# Patient Record
Sex: FEMALE | Race: WHITE | NOT HISPANIC OR LATINO | URBAN - METROPOLITAN AREA
[De-identification: names, ages, dates, MRNs, and addresses within clinical notes are randomized per-mention and may not be internally consistent; named-entity substitution may affect disease eponyms.]

---

## 2017-01-31 ENCOUNTER — ALLSCRIPTS OFFICE VISIT (OUTPATIENT)
Dept: OTHER | Facility: OTHER | Age: 22
End: 2017-01-31

## 2017-02-17 ENCOUNTER — ALLSCRIPTS OFFICE VISIT (OUTPATIENT)
Dept: OTHER | Facility: OTHER | Age: 22
End: 2017-02-17

## 2017-02-23 ENCOUNTER — GENERIC CONVERSION - ENCOUNTER (OUTPATIENT)
Dept: OTHER | Facility: OTHER | Age: 22
End: 2017-02-23

## 2017-04-20 ENCOUNTER — ALLSCRIPTS OFFICE VISIT (OUTPATIENT)
Dept: OTHER | Facility: OTHER | Age: 22
End: 2017-04-20

## 2017-06-01 ENCOUNTER — ALLSCRIPTS OFFICE VISIT (OUTPATIENT)
Dept: OTHER | Facility: OTHER | Age: 22
End: 2017-06-01

## 2017-06-29 ENCOUNTER — ALLSCRIPTS OFFICE VISIT (OUTPATIENT)
Dept: OTHER | Facility: OTHER | Age: 22
End: 2017-06-29

## 2017-07-13 ENCOUNTER — ALLSCRIPTS OFFICE VISIT (OUTPATIENT)
Dept: OTHER | Facility: OTHER | Age: 22
End: 2017-07-13

## 2017-09-28 ENCOUNTER — ALLSCRIPTS OFFICE VISIT (OUTPATIENT)
Dept: OTHER | Facility: OTHER | Age: 22
End: 2017-09-28

## 2017-10-12 ENCOUNTER — ALLSCRIPTS OFFICE VISIT (OUTPATIENT)
Dept: OTHER | Facility: OTHER | Age: 22
End: 2017-10-12

## 2018-01-09 NOTE — PSYCH
Behavioral Health Outpatient Intake    Referred By: Sumeet Delgado  Intake Questions: there are no developmental disabilities  the patient does not have a hearing impairment  the patient does not have an ICM or CTT  patient is not taking injectable psychiatric medications  Employment: The patient is employed part time at [address] [place] [address]  Emergency Contact Information:   Emergency Contact: KYLIE TORRES   Relationship to Patient: MOTHER   Phone Number: [phone]   Previous Psychiatric Treatment: She has previously been seen by a psychiatrist  ?, 3 YRS AGO  She has not been previously seen by a therapist     History: no  service  She has not had combat service  She was not activated into federal active duty as a member of the [employer] or [employer]  Insurance Subscriber: DES MELISSA   : 3-54   Employer: [employer]   Primary Insurance: Osmel Muñoz   Phone number: [phone]   ID number: [id number]   Group number: [id number]         Presenting Problem (in patient's words): DEPRESSION AND ANXIETY  Substance Abuse: NONE  Previous Treatment: The patient has not been seen here in the past      Accepted as Patient   Sisi Martins 17 @ 11:00     Primary Care Physician: DR Carlyle Redd   Electronically signed by : Joey Cardoza, ; 2017  8:29AM EST                       (Author)

## 2018-01-09 NOTE — PSYCH
Progress Note  Psychotherapy Provided [facility]: Individual Psychotherapy 45 minutes provided today  Goals addressed in session:   Goals: 1, 2 & 3  D- Luciano Tate stated that she has been doing well  She shared that she made new friends at the [facility]  In addition, she has been keeping up with her assignments at school and also has been doing very well at work in organizing her tasks  Continuing to work on use of organizational skills as well as ways to increase socialization  GIving supportive therapy  A- PRogress - Continuing to increase organizational skills  P-Continue treatment       Pain Scale and Suicide Risk [facility]: Current Pain Assessment: no pain   On a scale of 0 to 10, the patient rates current pain at 0   Behavioral Health Treatment Plan [facility]: Diagnosis and Treatment Plan explained to patient, patient relates understanding diagnosis and is agreeable to Treatment Plan  Assessment    1   Social anxiety disorder (300 23) (F40 10)    Signatures   Electronically signed by : Prince Dia LCSW; Oct  2 2017 11:43AM EST                       (Author)

## 2018-01-11 NOTE — PSYCH
History of Present Illness  Psychotherapy Provided [facility]: Individual Psychotherapy 30 minutes minutes provided today  Goals addressed in session:   Patient has history of anxiety and mood issues  Had been in counseling in [facility] briefly  Was prescribed Amitriptyline in past  Continues to have issues with social anxiety  C/O issues with focus and concentration that affects her schoolwork  Is studying business at [employer]  Works at [employer], Mother in treatment for Bipolar Disorder  Patient wondering if focus or concentration issues are ADHD related, May be more of a by-product of her anxiety issues  Patient verbal and cooperative  HPI - Psych: Patient has prior brief counseling and treatment history via [facility]  No inpatient treatment, Mother in treatment for Bipolar Disorder and has history of micky  Patient denies ay issues with mood lability  Denies any depressive symptoms  No SI  Anxiety more of an issues in social settings but she has drew able to manage   Note   Note:   Patient in agreement to be evaluated by psychiatrist for anxiety, possible ADHD issues  Will complete referral to [facility]  for service  Reviewed some coping strategies for anxiety and she was given handout on same  Provided my contact information and offered additional sessions if needed  Assessment    1   Social anxiety disorder (300 23) (F40 10)    Signatures   Electronically signed by : Katina Reyes LCSW; Feb 17 2017  9:12AM EST                       (Author)

## 2018-01-13 VITALS
HEART RATE: 92 BPM | HEIGHT: 62 IN | RESPIRATION RATE: 16 BRPM | DIASTOLIC BLOOD PRESSURE: 76 MMHG | WEIGHT: 144 LBS | TEMPERATURE: 98.5 F | BODY MASS INDEX: 26.5 KG/M2 | SYSTOLIC BLOOD PRESSURE: 124 MMHG

## 2018-01-15 NOTE — PSYCH
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  In addition, she is taking a trip to visit with friends which is a positive focus  Working on issues with school assignment and staying on task  Discussing ways to break down assignments and ways to approach problem solving in general  Giving supportive therapy  A - Progress - Continuing to work on use of problem solving skills  P-Continue treatment       Pain Scale and Suicide Risk [facility]: Current Pain Assessment: no pain   On a scale of 0 to 10, the patient rates current pain at 0   Behavioral Health Treatment Plan [employer]: Diagnosis and Treatment Plan explained to patient, patient relates understanding diagnosis and is agreeable to Treatment Plan  Assessment    1   Social anxiety disorder (300 23) (F40 10)    Signatures   Electronically signed by : Federico Farfan LCSW; Jun 29 2017  3:53PM EST                       (Author)

## 2018-01-15 NOTE — PSYCH
Date of Initial Treatment Plan: 6/1/17  Date of Current Treatment Plan: 6/1/17  Treatment Plan 1  Strengths/Personal Resources for Self Care: I'm a supportive friend, I always look out for people, clever, I put God first, creativity  Diagnosis:   Axis I: Social anxiety   Axis II: Deferred   Axis III: Good health     Area of Needs: Social anxiety  Staying on task  Lack of motivation  Long Term Goals:   Initiate more   Target Date: 10/01/17      Graduate   Target Date: 10/01/17      Stay focused on the important things   Target Date: 10/01/17    Short Term Objectives:   Goal 1:   Ask someone to do something that I don't normally spend time with  Get out of my comfort zone more  Learn to participate more when I have the opportunity  Participate more on discussion boards for my online courses  Participate in business club at school  Comment more at meetings  Talk to more of the older members at the [facility]  Target Date: 10/01/17      Goal 2:   Turn in all of my assignments on time  Make sure that I study for the exams  Learn how to study for exams  Ask for help when I need it  Target Date:       Goal 3:   Wake up at a normal time in the morning  Put my phone away before bed so that I'm not up late  Stay in a routine for chore and assignments  Decrease procrastination  Work on getting ministry time in early in the month  Target Date: 10/01/17      GOAL 1: Modality: Individual 1-2 x per month Target Date: 10/01/17       The person(s) responsible for carrying out the plan is Katerin Torrez  GOAL 2: Modality: Individual 1-2 x per month Target Date: 10/01/17       The person(s) responsible for carrying out the plan is Katerin Torrez  GOAL 3: Modality: Individual 1-2 x per month Target Date: 10/01/17         The person(s) responsible for carrying out the plan is Katerin Torrez  The first scheduled review date is 10/01/17                 Patient Signature: _________________________________ Date/Time: ______________        1  Amenorrhea (626 0) (N91 2)   2  Depression with anxiety (300 4) (F41 8)   3  Ovarian cyst (620 2) (N83 20)   4  PCOS (polycystic ovarian syndrome) (256 4) (E28 2)   5   Social anxiety disorder (300 23) (F40 10)     Electronically signed by : Jasen Baer LCSW; Jun 1 2017  9:53AM EST                       (Author)

## 2018-01-15 NOTE — PSYCH
Progress Note  Psychotherapy Provided [facility]: Individual Psychotherapy 45 minutes provided today  Goals addressed in session:   Goals: 1, 2 & 3  D- Corinna Carver stated that she has been doing well  She admits to continuing to struggle with social anxiety  Discussing symptoms as well as triggers  Discussing ways for her to increase social interaction as well as motivation  Completing the PHQ9 determining moderate depression  Creating a treatment plan  Giving supportive therapy  A- PRogress -Continuing to work on decreasing social anxiety  P-Continue treatment       Pain Scale and Suicide Risk [facility]: Current Pain Assessment: no pain   On a scale of 0 to 10, the patient rates current pain at 0   Behavioral Health Treatment Plan [facility]: Diagnosis and Treatment Plan explained to patient, patient relates understanding diagnosis and is agreeable to Treatment Plan  Results/Data  PHQ-9 Adult Depression Screening 01Jun2017 09:12AM Jeffy Cottrell     Test Name Result Flag Reference   PHQ-9 Adult Depression Score 10     Over the last two weeks, how often have you been bothered by any of the following problems? Little interest or pleasure in doing things: More than half the days - 2  Feeling down, depressed, or hopeless: Several days - 1  Trouble falling or staying asleep, or sleeping too much: More than half the days - 2  Feeling tired or having little energy: More than half the days - 2  Poor appetite or over eating: Several days - 1  Feeling bad about yourself - or that you are a failure or have let yourself or your family down: Several days - 1  Trouble concentrating on things, such as reading the newspaper or watching television: Several days - 1  Moving or speaking so slowly that other people could have noticed   Or the opposite -  being so fidgety or restless that you have been moving around a lot more than usual: Not at all - 0  Thoughts that you would be better off dead, or of hurting yourself in some way: Not at all - 0   PHQ-9 Adult Depression Screening Negative     PHQ-9 Difficulty Level Not difficult at all     PHQ-9 Severity Moderate Depression         Assessment    1   Social anxiety disorder (300 23) (F40 10)    Signatures   Electronically signed by : Kendy Butts LCSW; Jun 1 2017 12:49PM EST                       (Author)

## 2018-01-16 NOTE — PSYCH
Progress Note  Psychotherapy Provided [facility]: Individual Psychotherapy 45 minutes provided today  Goals addressed in session:   Goals: 1 & 2  D- Lee Caal stated that she [place] her [place]  She stated that she has also been working on staying on task with her school work and maintaining a structured schedule as well as using visualization techniques in order to complete assignments in a timely manner  Working on forming and maintaining healthy relationships in her life while reducing social anxiety  Giving supportive therapy  A- PRogress - Continuing to work on decreasing social anxiety  P-Continue treatment       Pain Scale and Suicide Risk [facility]: Current Pain Assessment: no pain   On a scale of 0 to 10, the patient rates current pain at 0   Behavioral Health Treatment Plan [facility]: Diagnosis and Treatment Plan explained to patient, patient relates understanding diagnosis and is agreeable to Treatment Plan  Assessment    1   Social anxiety disorder (300 23) (F40 10)    Signatures   Electronically signed by : Claudio Goodwin LCSW; Jul 13 2017  4:22PM EST                       (Author)

## 2018-01-17 NOTE — PSYCH
Progress Note  Psychotherapy Provided [facility]: Individual Psychotherapy 45 minutes provided today  Goals addressed in session:   Goals: 1 & 2  D- Nelly Lawson stated that she feels that she is doing well  She shared that she is enjoying her promotion at work  In addition, she feels that her semester is continuing to go well at school  She discussed her upcoming trip to [facility] with her mother to visit family and to attend a wedding  She also stated that she continues to work on increasing socialization  Continuing to work on decreasing social anxiety  Giving supportive therapy  A- PRogress - Continuing to process her emotions  P-Continue treatment       Pain Scale and Suicide Risk [facility]: Current Pain Assessment: no pain   On a scale of 0 to 10, the patient rates current pain at 0   Behavioral Health Treatment Plan [facility]: Diagnosis and Treatment Plan explained to patient, patient relates understanding diagnosis and is agreeable to Treatment Plan  Assessment    1   Social anxiety disorder (300 23) (F40 10)    Signatures   Electronically signed by : Gregg Hsu LCSW; Nov 28 2017  1:45PM EST                       (Author)

## 2018-01-17 NOTE — PSYCH
History of Present Illness    Pre-morbid level of function and History of Present Illness:  Sheba Dyer is a 25 Y O female referred for treatment by Maria Ines Garza  She stated that she struggles with anxiety as well as concentration and focus  Reason for evaluation and partial hospitalization as an alternative to inpatient hospitalization: N/A  Previous Psychiatric/psychological treatment/year: Outpatient Therapy/psychiatry [facility]  Current Psychiatrist/Therapist: [facility]  Outpatient and/or Partial and Other Community Resources Used (CTT, ICM, VNA): Outpatient: Therapy/psychiatry  Family Constellation (include parents, relationship with each and pertinent Psych/Medical History): Mother: Lives with -bipolar   Father: Lives with   Other: Paternal grandmother-lives with   The patient relates best to Family  She lives with Parents and paternal grandmother  She does not live alone  Domestic Violence: No past history of domestic violence  The patient is not currently experiencing domestic violence  There is not suspected domestic violence  There is no history of child abuse  There is no history of sexual abuse  Additional Comments related to family/relationships/peer support: Sheba Dyer states that she gets along with her family  She shared that her parents do fight a lot  In a addition, her paternal grandmother has come to live with them and this has caused more stress in the household  School or Work History (strengths/limitations/needs: 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 [employer]  Her highest grade level achieved was  one year of college   history includes N/A  Financial status includes N/A    LEISURE ASSESSMENT (Include past and present hobbies/interests and level of involvement (Ex: Group/Club Affiliations): Enjoys reading  Her primary language is  [place]  Ethnic considerations are    Religions affiliations and level of involvement - Mormon-active   Spirituality and gauri have helped her cope with difficult situations in her life  The patient learns best by  demonstration and pictures  SUBSTANCE ABUSE ASSESSMENT: no current substance abuse and no past substance abuse  HEALTH ASSESSMENT: She has not lost 10 lbs or more in the last 6 months without trying  She has not had decreased appetite for 5 days or more  She has not gained 10 lbs or more in the last 6 months without trying  no nausea  no vomiting  no diarrhea  no referral to PCP needed  no referral to nutritionist needed  no pregnancy  She is not receiving prenatal care  not referred to PCP  Current PCP: Dr Abigail Pavon  PCP notified  LEGAL: No Mental Health Advance Directive or Power of  on file  She does not want an information packet about Mental Health Advance Directives  The following ratings are based on my interview(s) with Alayna Tabor  Risk of Harm to Self:   Demographic risk factors include , alaskan, or native [place], never  or  status and age: young adult (15-24)  Risk of Harm to Others:   Demographic Risk Factors include: [age] years of age  Review of Systems  anxiety, depression and impulsive behavior, but no euphoria, no emotional lability, no hostility, not suidical, no compulsive behavior, no unusual behavior, no violent behavior, no disturbing or unusual thoughts, feelings, or sensations, no unreasonable or irrational fears, no magical thinking, not having fantasies, no interpersonal relationship problems, no emotional problems/concerns, no sleep disturbances, normal functioning ability, no personality change and no character deficiency  Constitutional: No fever, no chills, feels well, no tiredness, no recent weight gain or weight loss  Eyes: No complaints of eye pain, no red eyes, no eyesight problems, no discharge, no dry eyes, no itching of eyes     ENT: no complaints of earache, no loss of hearing, no nose bleeds, no nasal discharge, no sore throat, no hoarseness  Cardiovascular: No complaints of slow heart rate, no fast heart rate, no chest pain, no palpitations, no leg claudication, no lower extremity edema  Respiratory: No complaints of shortness of breath, no wheezing, no cough, no SOB on exertion, no orthopnea, no PND  Gastrointestinal: No complaints of abdominal pain, no constipation, no nausea or vomiting, no diarrhea, no bloody stools  Genitourinary: No complaints of dysuria, no incontinence, no pelvic pain, no dysmenorrhea, no vaginal discharge or bleeding  Musculoskeletal: No complaints of arthralgias, no myalgias, no joint swelling or stiffness, no limb pain or swelling  Integumentary: No complaints of skin rash or lesions, no itching, no skin wounds, no breast pain or lump  Neurological: No complaints of headache, no confusion, no convulsions, no numbness, no dizziness or fainting, no tingling, no limb weakness, no difficulty walking  Psychiatric: as noted in HPI  Endocrine: No complaints of proptosis, no hot flashes, no muscle weakness, no deepening of the voice, no feelings of weakness  Hematologic/Lymphatic: No complaints of swollen glands, no swollen glands in the neck, does not bleed easily, does not bruise easily  ROS reviewed  Active Problems    1  Amenorrhea (626 0) (N91 2)   2  Depression with anxiety (300 4) (F41 8)   3  Ovarian cyst (620 2) (N83 20)   4  PCOS (polycystic ovarian syndrome) (256 4) (E28 2)   5  Social anxiety disorder (300 23) (F40 10)    Past Medical History    1  History of Headache (784 0) (R51)    The active problems and past medical history were reviewed and updated today  Surgical History    The surgical history was reviewed and updated today  Family Psych History  Mother    1  Family history of depression (V17 0) (Z81 8)  Father    2   Family history of Crohn's disease (V18 59) (Z83 79)  Maternal Great Grandmother 3  Family history of Breast cancer  Paternal Aunt    4  Family history of Type 2 diabetes mellitus with hyperglycemia  Family History    5  Family history of Diabetes mellitus   6  Family history of Hypertension   7  Family history of Thyroid disorder    The family history was reviewed and updated today  Social History    · Denied: History of Alcohol use   · Never a smoker   · No illicit drug use  The social history was reviewed and updated today  The social history was reviewed and is unchanged  Current Meds    The medication list was reviewed and updated today  Allergies    1  No Known Drug Allergies    Physical Exam    Appearance: was calm and cooperative, adequate hygiene and grooming and good eye contact  Observed mood: mood appropriate  Observed mood: affect appropriate  Speech: a normal rate  Thought processes: coherent/organized  Hallucinations: no hallucinations present  Thought Content: no delusions  Abnormal Thoughts: The patient has no suicidal thoughts and no homicidal thoughts  Orientation: The patient is oriented to person, place and time  Recent and Remote Memory: short term memory intact and long term memory intact  Attention Span And Concentration: concentration impaired  Insight: Insight intact  Judgment: Her judgment was intact  Muscle Strength And Tone  Muscle strength and tone were normal  Normal gait and station  Fund of knowledge: Patient displays adequate knowledge of current events, adequate fund of knowledge regarding past history and adequate fund of knowledge regarding vocabulary  The patient is experiencing no localized pain  On a scale of 0 - 10 the pain severity is a 0  DSM    Provisional Diagnosis: Social Anxiety Disorder  R/O ADHD  R/O MDD  Assessment    1   Social anxiety disorder (300 23) (F40 10)    Future Appointments    Date/Time Provider Specialty Site   06/01/2017 09:00 AM Galdino Armando [facility] Psychiatry  [address] [facility]   06/29/2017 03:00 PM Adalberto Martinez LCSW Psychiatry [facility]   07/13/2017 02:00 PM Adalberto Martinez [facility]     Signatures   Electronically signed by : Carli Maher LCSW; Apr 21 2017 11:40AM EST                       (Author)    Electronically signed by : DORIE Perkins ; Apr 21 2017 12:14PM EST                       (Author)